# Patient Record
Sex: MALE | Race: WHITE
[De-identification: names, ages, dates, MRNs, and addresses within clinical notes are randomized per-mention and may not be internally consistent; named-entity substitution may affect disease eponyms.]

---

## 2019-10-04 ENCOUNTER — HOSPITAL ENCOUNTER (OUTPATIENT)
Dept: HOSPITAL 84 - D.RT | Age: 64
Discharge: HOME | End: 2019-10-04
Attending: NURSE PRACTITIONER
Payer: MEDICARE

## 2019-10-04 VITALS — BODY MASS INDEX: 37.3 KG/M2

## 2019-10-04 DIAGNOSIS — R06.02: Primary | ICD-10-CM

## 2019-11-13 ENCOUNTER — HOSPITAL ENCOUNTER (OUTPATIENT)
Dept: HOSPITAL 84 - D.OPS | Age: 64
Discharge: HOME | End: 2019-11-13
Attending: INTERNAL MEDICINE
Payer: MEDICARE

## 2019-11-13 VITALS — WEIGHT: 267.56 LBS | HEIGHT: 71 IN | BODY MASS INDEX: 37.46 KG/M2

## 2019-11-13 VITALS — SYSTOLIC BLOOD PRESSURE: 126 MMHG | DIASTOLIC BLOOD PRESSURE: 72 MMHG

## 2019-11-13 DIAGNOSIS — K63.5: Primary | ICD-10-CM

## 2019-11-13 DIAGNOSIS — Z12.11: ICD-10-CM

## 2019-11-13 DIAGNOSIS — Z86.010: ICD-10-CM

## 2019-11-13 LAB
BASOPHILS NFR BLD AUTO: 0.4 % (ref 0–2)
EOSINOPHIL NFR BLD: 1.3 % (ref 0–7)
ERYTHROCYTE [DISTWIDTH] IN BLOOD BY AUTOMATED COUNT: 13.2 % (ref 11.5–14.5)
HCT VFR BLD CALC: 45.7 % (ref 42–54)
HGB BLD-MCNC: 15.5 G/DL (ref 13.5–17.5)
IMM GRANULOCYTES NFR BLD: 0.3 % (ref 0–5)
LYMPHOCYTES NFR BLD AUTO: 34.6 % (ref 15–50)
MCH RBC QN AUTO: 31.6 PG (ref 26–34)
MCHC RBC AUTO-ENTMCNC: 33.9 G/DL (ref 31–37)
MCV RBC: 93.3 FL (ref 80–100)
MONOCYTES NFR BLD: 8.9 % (ref 2–11)
NEUTROPHILS NFR BLD AUTO: 54.5 % (ref 40–80)
PLATELET # BLD: 296 10X3/UL (ref 130–400)
PMV BLD AUTO: 9 FL (ref 7.4–10.4)
RBC # BLD AUTO: 4.9 10X6/UL (ref 4.2–6.1)
WBC # BLD AUTO: 6.7 10X3/UL (ref 4.8–10.8)

## 2019-11-20 NOTE — OP
PATIENT NAME:  BHAVESH RODRIGUEZ                           MEDICAL RECORD: [id number]
:53                                             LOCATION:D.OPS          
                                                         ADMISSION DATE:        
SURGEON:  HARIS AGGARWAL DO             
 
 
DATE OF OPERATION:  2019
 
PROCEDURE:  Colonoscopy with polypectomy.
 
INDICATIONS FOR PROCEDURE:  Screening for colorectal cancer with a history of
polyps.
 
SCOPE:  HeartFlow video pediatric colonoscope.
 
MEDICATIONS:  Propofol 400 mg IV per anesthesia.
 
WITHDRAWAL TIME:  23 minutes.
 
ESTIMATED BLOOD LOSS:  Minimal.
 
COMPLICATIONS:  None.
 
FINDINGS:  Informed consent was given.  The patient was made comfortable with
the above medication.  After reaching an adequate level of sedation by slow IV
push, the patient was placed on his left side.  A digital rectal examination was
performed and was normal.  The endoscope was then advanced under direct
visualization through the rectum to the cecum, confirmed by the presence of the
appendiceal orifice and ileocecal valve.  The endoscope was slowly withdrawn. 
Mucosa was carefully examined.  Prep quality was good.  There were 2 polyps
visualized on today's examination.  The first was a benign appearing sessile
polyp that measured approximately 4-5 mm in diameter.  It was located in the
ascending colon.  It was removed using a hot snare in 1 piece and completely
retrieved.  In the descending colon, there was another small benign appearing
sessile polyp, which measured approximately 3-4 mm in diameter.  It was removed
using a hot forcep.  There was evidence of mild-to-moderate diverticulosis
involving the descending and sigmoid colon with a few scattered diverticula
throughout the transverse and ascending colon.  Retroflexion was performed in
the rectum with visualization of grade I internal hemorrhoids without bleeding. 
The endoscope was withdrawn from the patient.  The patient tolerated the
procedure well and there were no complications.
 
IMPRESSION:
1.  Mild-to-moderate diverticulosis mainly focused in the descending and sigmoid
colon with a few scattered diverticula throughout the transverse and ascending
colon.
2.  Grade I internal hemorrhoids without bleeding.
3.  Two benign-appearing sessile polyps as described above, removed using a
combination of hot snare and hot forceps.
 
PLAN AND RECOMMENDATIONS:
1.  Discharge home when recovery parameters are met.
2.  Follow up biopsy specimen results.
3.  High fiber diet.
4.  Continue current medications.
5.  Recall colonoscopy in 5 years.
 
TRANSINT:[id number] Voice Confirmation ID: [id number] DOCUMENT ID: [id number]
 
 
 
OPERATIVE REPORT                               [id number]    BHAVESH RODRIGUEZHARIS TOVAR DO             
 
 
 
Electronically Signed by HARIS CHAN DO VOISE on 19 at 1909
 
 
 
 
 
 
 
 
 
 
 
 
 
 
 
 
 
 
 
 
 
 
 
 
 
 
 
 
 
 
 
 
 
 
 
 
 
 
 
 
 
CC:                                                             [id number]
DICTATION DATE: 19 1339     :     19 2310      [facility] 
                                                                      [address] [place] [address]